# Patient Record
Sex: MALE | NOT HISPANIC OR LATINO | Employment: FULL TIME | URBAN - METROPOLITAN AREA
[De-identification: names, ages, dates, MRNs, and addresses within clinical notes are randomized per-mention and may not be internally consistent; named-entity substitution may affect disease eponyms.]

---

## 2021-12-30 ENCOUNTER — HOSPITAL ENCOUNTER (EMERGENCY)
Facility: CLINIC | Age: 26
Discharge: HOME | End: 2021-12-31
Attending: EMERGENCY MEDICINE | Admitting: EMERGENCY MEDICINE
Payer: OTHER GOVERNMENT

## 2021-12-30 ENCOUNTER — APPOINTMENT (OUTPATIENT)
Dept: GENERAL RADIOLOGY | Facility: CLINIC | Age: 26
End: 2021-12-30
Attending: EMERGENCY MEDICINE
Payer: OTHER GOVERNMENT

## 2021-12-30 DIAGNOSIS — S52.502A CLOSED FRACTURE OF DISTAL END OF LEFT RADIUS, UNSPECIFIED FRACTURE MORPHOLOGY, INITIAL ENCOUNTER: ICD-10-CM

## 2021-12-30 PROCEDURE — 99285 EMERGENCY DEPT VISIT HI MDM: CPT | Mod: 25

## 2021-12-30 PROCEDURE — 73110 X-RAY EXAM OF WRIST: CPT | Mod: LT

## 2021-12-30 PROCEDURE — 99152 MOD SED SAME PHYS/QHP 5/>YRS: CPT

## 2021-12-30 PROCEDURE — 25605 CLTX DST RDL FX/EPHYS SEP W/: CPT | Mod: LT

## 2021-12-30 RX ORDER — PROPOFOL 10 MG/ML
INJECTION, EMULSION INTRAVENOUS
Status: DISCONTINUED
Start: 2021-12-30 | End: 2021-12-30 | Stop reason: HOSPADM

## 2021-12-30 NOTE — ED TRIAGE NOTES
Pt ell and landed on his left wrist - fingers are a bit cold as he was outside- his sister ace wrapped his wrist - he can move fingers and feel touch. Sent to xray   Vss.

## 2021-12-31 ENCOUNTER — APPOINTMENT (OUTPATIENT)
Dept: GENERAL RADIOLOGY | Facility: CLINIC | Age: 26
End: 2021-12-31
Attending: EMERGENCY MEDICINE
Payer: OTHER GOVERNMENT

## 2021-12-31 VITALS
HEART RATE: 78 BPM | DIASTOLIC BLOOD PRESSURE: 74 MMHG | RESPIRATION RATE: 18 BRPM | SYSTOLIC BLOOD PRESSURE: 112 MMHG | TEMPERATURE: 98.2 F | OXYGEN SATURATION: 98 %

## 2021-12-31 PROCEDURE — 250N000011 HC RX IP 250 OP 636

## 2021-12-31 PROCEDURE — 999N000065 XR WRIST LEFT G/E 3 VIEWS: Mod: LT

## 2021-12-31 RX ORDER — HYDROCODONE BITARTRATE AND ACETAMINOPHEN 5; 325 MG/1; MG/1
1-2 TABLET ORAL EVERY 4 HOURS PRN
Qty: 15 TABLET | Refills: 0 | Status: SHIPPED | OUTPATIENT
Start: 2021-12-31

## 2021-12-31 RX ORDER — PROPOFOL 10 MG/ML
INJECTION, EMULSION INTRAVENOUS
Status: DISCONTINUED
Start: 2021-12-31 | End: 2021-12-31 | Stop reason: HOSPADM

## 2021-12-31 RX ADMIN — PROPOFOL 360 MG: 10 INJECTION, EMULSION INTRAVENOUS at 00:23

## 2021-12-31 ASSESSMENT — ENCOUNTER SYMPTOMS: NUMBNESS: 1

## 2021-12-31 NOTE — ED PROVIDER NOTES
History   Chief Complaint:  Fall       The history is provided by the patient.      Krishan Winter is a 26 year old male who is right handed who presents to the emergency department with his wife for evaluation after a fall. Earlier today, approximately between [id number], the patient was playing broom ball when he slipped on the ice and fell, landing on his extended left wrist. Here, the patient presents with an injury to his left wrist as well and numbness and tingling to his left fingers. His left wrist was wrapped. The patient denies any other injuries. The patient plans to travel home to [place] on 01/05/22.      Review of Systems   Musculoskeletal:        Left wrist injury   Neurological: Positive for numbness.        Tingling of left fingers   All other systems reviewed and are negative.        Allergies:  Patient has no known allergies on file.     Medications:  Patient has no medications on file.     Past Medical History:     Patient has no medical history on file.     Past Surgical History:    Patient hs no history of surgery.     Family History:    Thyroid Disease (mother)     Social History:  Patient is .     Physical Exam     Patient Vitals for the past 24 hrs:   BP Temp Temp src Pulse Resp SpO2   12/31/21 0147 112/74 -- -- 78 18 98 %   12/31/21 0030 -- -- -- 74 16 100 %   12/31/21 0025 92/82 -- -- 68 13 100 %   12/31/21 0020 118/78 -- -- 70 12 99 %   12/30/21 1711 (!) 140/85 98.2  F (36.8  C) Temporal 78 16 100 %       Physical Exam  Nursing note and vitals reviewed.  Constitutional:  Oriented to person, place, and time. Cooperative.   HENT:   Nose:    Nose normal.   Mouth/Throat:   Mucous membranes are normal.   Eyes:    Conjunctivae normal and EOM are normal.      Pupils are equal, round, and reactive to light.   Neck:    Trachea normal.   Cardiovascular:  Normal rate, regular rhythm, normal heart sounds and normal pulses. No murmur heard.  Pulmonary/Chest:  Effort normal and breath sounds  normal.   Abdominal:   Soft. Normal appearance and bowel sounds are normal.      There is no tenderness.      There is no rebound and no CVA tenderness.   Musculoskeletal:  Obvious deformity to left wrist with tenderness to palpation. Extremities are otherwise atraumatic.  Lymphadenopathy:  No cervical adenopathy.   Neurological:   Alert and oriented to person, place, and time. Normal strength. Distal CMS intact in the fingers of the left hand.     No cranial nerve deficit or sensory deficit. GCS eye subscore is 4. GCS verbal subscore is 5. GCS motor subscore is 6.   Skin:    Skin is intact. No rash noted.   Psychiatric:   Normal mood and affect.    Emergency Department Course     Imaging:  XR Wrist Left 3 Views   Final Result   IMPRESSION: Splint material now present obscuring some bony detail.   Improved alignment of the comminuted distal radial fracture with alignment near anatomic, proximally 0 degrees angulation of distal articular surface.      XR Wrist Left G/E 3 Views   Final Result   IMPRESSION:   1.  Acute transverse fracture of the left radius distal metaphysis with mild comminution, impaction, dorsal displacement, and volar apex angulation. Probable intra-articular fracture extension into the medial articular surface.   2.  Subtle fracture of the ulna styloid process.   3.  Ulna positive variance.   4.  Soft tissue swelling about the wrist.        Report per radiology    [facility]    -Fracture    Date/Time: 12/31/2021 2:13 AM  Performed by: Leroy Somers MD  Authorized by: Leroy Somers MD     Risks, benefits and alternatives discussed.    ED EVALUATION:      I have performed an Emergency Department Evaluation including taking a history and physical examination, this evaluation will be documented in the electronic medical record for this ED encounter.      ASA Class: Class 1- healthy patient    Mallampati: Grade 1- soft palate, uvula, tonsillar pillars, and  posterior pharyngeal wall visible    NPO Status: appropriately NPO for procedure    UNIVERSAL PROTOCOL   Site Marked: Yes  Prior Images Obtained and Reviewed:  Yes  Required items: Required blood products, implants, devices and special equipment available    Patient identity confirmed:  Verbally with patient, arm band, hospital-assigned identification number and provided demographic data  Patient was reevaluated immediately before administering moderate or deep sedation or anesthesia  Confirmation Checklist:  Patient's identity using two indicators, relevant allergies, procedure was appropriate and matched the consent or emergent situation and correct equipment/implants were available  Time out: Immediately prior to the procedure a time out was called    Universal Protocol: the Joint Commission Universal Protocol was followed    Preparation: Patient was prepped and draped in usual sterile fashion      INJURY      Injury location:  Forearm    Forearm injury location:  L forearm    Forearm fracture type: distal radial      PRE PROCEDURE ASSESSMENT      Neurological function: normal      Distal perfusion: normal      Range of motion: reduced      SEDATION  Patient Sedated: Yes    Sedation Type:  Deep  Sedation:  Propofol  Vital signs: Vital signs monitored during sedation      ANESTHESIA (see MAR for exact dosages)      Anesthesia method:  None    PROCEDURE DETAILS:     Manipulation performed: yes      Skeletal traction used: yes      Pin inserted: no      Reduction successful: yes      X-ray confirmed reduction: yes      Immobilization:  Splint and sling    Splint type:  Sugar tong    Supplies used:  Plaster    POST PROCEDURE ASSESSMENT      Neurological function: normal      Distal perfusion: normal      Range of motion: unchanged        PROCEDURE    Patient Tolerance:  Patient tolerated the procedure well with no immediate complications  Length of time physician/provider present for 1:1 monitoring during sedation:  15      Emergency Department Course:    Reviewed:  I reviewed nursing notes, vitals, past medical history, Care Everywhere and [facility]    Assessments:  2122 I obtained history and examined the patient as noted above.   0103 I rechecked the patient and explained findings.     Disposition:  The patient was discharged to home.     Impression & Plan     Medical Decision Making:  This is a 26-year-old male who came in for further evaluation of an injury to his left wrist.  He had x-rays obtained showing the above fracture.  Clearly this is one that required reduction, which I performed per the above procedure note.  We placed him in a plaster sugar tong splint as well.  He had post procedure x-rays obtained, showing a good reduction.  Of note, I did loosen the Ace wrap after this due to some slightly worsening tingling in his fingers, and this seemed to help.  He understands that he likely will require surgery.  He is going to follow-up with either one of our orthopedists at [facility] or possibly follow-up in [place].  I will send him home with a prescription for Norco.  He knows to return with any concerns or worsening symptoms.    Diagnosis:    ICD-10-CM    1. Closed fracture of distal end of left radius, unspecified fracture morphology, initial encounter  S52.502A        Discharge Medications:  Discharge Medication List as of 12/31/2021  1:08 AM      START taking these medications    Details   HYDROcodone-acetaminophen (NORCO) 5-325 MG tablet Take 1-2 tablets by mouth every 4 hours as needed for pain, Disp-15 tablet, R-0, Local Print             Scribe Disclosure:  IChel, am serving as a scribe at 9:22 PM on 12/31/2021 to document services personally performed by Leroy Somers MD based on my observations and the provider's statements to me.     ILacie, am serving as a scribe at 0005 AM on 12/31/2021 to document services personally performed by Leroy Somers MD based on my  observations and the provider's statements to me.        Leroy Somers MD  12/31/21 3760

## 2022-02-13 ENCOUNTER — HEALTH MAINTENANCE LETTER (OUTPATIENT)
Age: 27
End: 2022-02-13

## 2022-10-15 ENCOUNTER — HEALTH MAINTENANCE LETTER (OUTPATIENT)
Age: 27
End: 2022-10-15

## 2023-03-26 ENCOUNTER — HEALTH MAINTENANCE LETTER (OUTPATIENT)
Age: 28
End: 2023-03-26

## 2024-05-26 ENCOUNTER — HEALTH MAINTENANCE LETTER (OUTPATIENT)
Age: 29
End: 2024-05-26